# Patient Record
Sex: MALE | Race: OTHER | Employment: FULL TIME | URBAN - METROPOLITAN AREA
[De-identification: names, ages, dates, MRNs, and addresses within clinical notes are randomized per-mention and may not be internally consistent; named-entity substitution may affect disease eponyms.]

---

## 2018-01-29 ENCOUNTER — HOSPITAL ENCOUNTER (EMERGENCY)
Facility: HOSPITAL | Age: 23
Discharge: HOME | End: 2018-01-29
Attending: EMERGENCY MEDICINE
Payer: COMMERCIAL

## 2018-01-29 VITALS
WEIGHT: 185 LBS | RESPIRATION RATE: 16 BRPM | OXYGEN SATURATION: 97 % | DIASTOLIC BLOOD PRESSURE: 50 MMHG | SYSTOLIC BLOOD PRESSURE: 100 MMHG | TEMPERATURE: 99 F | BODY MASS INDEX: 31.58 KG/M2 | HEART RATE: 80 BPM | HEIGHT: 64 IN

## 2018-01-29 DIAGNOSIS — G40.909 SEIZURE DISORDER (HCC): Primary | ICD-10-CM

## 2018-01-29 LAB
ALBUMIN SERPL BCP-MCNC: 3.8 G/DL (ref 3.5–4.8)
ALP SERPL-CCNC: 82 U/L (ref 32–100)
ALT SERPL-CCNC: 72 U/L (ref 17–63)
ANION GAP SERPL CALC-SCNC: 14 MMOL/L (ref 0–18)
AST SERPL-CCNC: 40 U/L (ref 15–41)
BASOPHILS # BLD: 0 K/UL (ref 0–0.2)
BASOPHILS NFR BLD: 1 %
BILIRUB DIRECT SERPL-MCNC: 0.1 MG/DL (ref 0–0.2)
BILIRUB SERPL-MCNC: 0.4 MG/DL (ref 0.3–1.2)
BUN SERPL-MCNC: 15 MG/DL (ref 8–20)
BUN/CREAT SERPL: 20 (ref 10–20)
CALCIUM SERPL-MCNC: 9 MG/DL (ref 8.5–10.5)
CHLORIDE SERPL-SCNC: 104 MMOL/L (ref 95–110)
CO2 SERPL-SCNC: 22 MMOL/L (ref 22–32)
CREAT SERPL-MCNC: 0.75 MG/DL (ref 0.5–1.5)
EOSINOPHIL # BLD: 0.5 K/UL (ref 0–0.7)
EOSINOPHIL NFR BLD: 6 %
ERYTHROCYTE [DISTWIDTH] IN BLOOD BY AUTOMATED COUNT: 13.4 % (ref 11–15)
GLUCOSE SERPL-MCNC: 133 MG/DL (ref 70–99)
HCT VFR BLD AUTO: 47.9 % (ref 41–52)
HGB BLD-MCNC: 16.4 G/DL (ref 13.5–17.5)
LYMPHOCYTES # BLD: 1.5 K/UL (ref 1–4)
LYMPHOCYTES NFR BLD: 19 %
MCH RBC QN AUTO: 30.4 PG (ref 27–32)
MCHC RBC AUTO-ENTMCNC: 34.3 G/DL (ref 32–37)
MCV RBC AUTO: 88.8 FL (ref 80–100)
MONOCYTES # BLD: 0.6 K/UL (ref 0–1)
MONOCYTES NFR BLD: 8 %
NEUTROPHILS # BLD AUTO: 5.1 K/UL (ref 1.8–7.7)
NEUTROPHILS NFR BLD: 67 %
OSMOLALITY UR CALC.SUM OF ELEC: 293 MOSM/KG (ref 275–295)
PLATELET # BLD AUTO: 145 K/UL (ref 140–400)
PMV BLD AUTO: 10 FL (ref 7.4–10.3)
POTASSIUM SERPL-SCNC: 4.1 MMOL/L (ref 3.3–5.1)
PROT SERPL-MCNC: 7.1 G/DL (ref 5.9–8.4)
RBC # BLD AUTO: 5.39 M/UL (ref 4.5–5.9)
SODIUM SERPL-SCNC: 140 MMOL/L (ref 136–144)
WBC # BLD AUTO: 7.7 K/UL (ref 4–11)

## 2018-01-29 PROCEDURE — 80048 BASIC METABOLIC PNL TOTAL CA: CPT | Performed by: EMERGENCY MEDICINE

## 2018-01-29 PROCEDURE — 96361 HYDRATE IV INFUSION ADD-ON: CPT

## 2018-01-29 PROCEDURE — 80076 HEPATIC FUNCTION PANEL: CPT | Performed by: EMERGENCY MEDICINE

## 2018-01-29 PROCEDURE — 96375 TX/PRO/DX INJ NEW DRUG ADDON: CPT

## 2018-01-29 PROCEDURE — 96374 THER/PROPH/DIAG INJ IV PUSH: CPT

## 2018-01-29 PROCEDURE — 99284 EMERGENCY DEPT VISIT MOD MDM: CPT

## 2018-01-29 PROCEDURE — 85025 COMPLETE CBC W/AUTO DIFF WBC: CPT | Performed by: EMERGENCY MEDICINE

## 2018-01-29 RX ORDER — LEVETIRACETAM 500 MG/1
500 TABLET ORAL 2 TIMES DAILY
Qty: 60 TABLET | Refills: 0 | Status: SHIPPED | OUTPATIENT
Start: 2018-01-29 | End: 2018-06-18

## 2018-01-29 RX ORDER — LORAZEPAM 2 MG/ML
2 INJECTION INTRAMUSCULAR ONCE
Status: COMPLETED | OUTPATIENT
Start: 2018-01-29 | End: 2018-01-29

## 2018-01-29 RX ORDER — LORAZEPAM 2 MG/ML
INJECTION INTRAMUSCULAR
Status: COMPLETED
Start: 2018-01-29 | End: 2018-01-29

## 2018-01-29 NOTE — ED NOTES
All discharge instructions including discharge meds and follow up reviewed with patient and parents. Verbalized understanding. IV removed with catheter intact. Patient wheelchaired out of ED in no apparent distress.

## 2018-01-29 NOTE — ED INITIAL ASSESSMENT (HPI)
Patient presents via EMS. Per EMS, patient had a witnessed seizure at home lasting approximately 4 min.

## 2018-03-14 ENCOUNTER — HOSPITAL ENCOUNTER (EMERGENCY)
Facility: HOSPITAL | Age: 23
Discharge: HOME | End: 2018-03-14
Attending: EMERGENCY MEDICINE
Payer: COMMERCIAL

## 2018-03-14 VITALS
WEIGHT: 130 LBS | TEMPERATURE: 98 F | SYSTOLIC BLOOD PRESSURE: 117 MMHG | BODY MASS INDEX: 20.89 KG/M2 | RESPIRATION RATE: 14 BRPM | HEIGHT: 66 IN | OXYGEN SATURATION: 99 % | DIASTOLIC BLOOD PRESSURE: 61 MMHG | HEART RATE: 56 BPM

## 2018-03-14 DIAGNOSIS — G40.919 BREAKTHROUGH SEIZURE (HCC): Primary | ICD-10-CM

## 2018-03-14 LAB
ANION GAP SERPL CALC-SCNC: 10 MMOL/L (ref 0–18)
BASOPHILS # BLD: 0.1 K/UL (ref 0–0.2)
BASOPHILS NFR BLD: 1 %
BUN SERPL-MCNC: 13 MG/DL (ref 8–20)
BUN/CREAT SERPL: 12.9 (ref 10–20)
CALCIUM SERPL-MCNC: 9 MG/DL (ref 8.5–10.5)
CHLORIDE SERPL-SCNC: 106 MMOL/L (ref 95–110)
CO2 SERPL-SCNC: 22 MMOL/L (ref 22–32)
CREAT SERPL-MCNC: 1.01 MG/DL (ref 0.5–1.5)
EOSINOPHIL # BLD: 0.3 K/UL (ref 0–0.7)
EOSINOPHIL NFR BLD: 4 %
ERYTHROCYTE [DISTWIDTH] IN BLOOD BY AUTOMATED COUNT: 13.2 % (ref 11–15)
GLUCOSE SERPL-MCNC: 138 MG/DL (ref 70–99)
HCT VFR BLD AUTO: 49.5 % (ref 41–52)
HGB BLD-MCNC: 16.7 G/DL (ref 13.5–17.5)
LYMPHOCYTES # BLD: 1.6 K/UL (ref 1–4)
LYMPHOCYTES NFR BLD: 23 %
MCH RBC QN AUTO: 30.2 PG (ref 27–32)
MCHC RBC AUTO-ENTMCNC: 33.7 G/DL (ref 32–37)
MCV RBC AUTO: 89.6 FL (ref 80–100)
MONOCYTES # BLD: 0.4 K/UL (ref 0–1)
MONOCYTES NFR BLD: 7 %
NEUTROPHILS # BLD AUTO: 4.4 K/UL (ref 1.8–7.7)
NEUTROPHILS NFR BLD: 65 %
OSMOLALITY UR CALC.SUM OF ELEC: 288 MOSM/KG (ref 275–295)
PLATELET # BLD AUTO: 156 K/UL (ref 140–400)
PMV BLD AUTO: 10.4 FL (ref 7.4–10.3)
POTASSIUM SERPL-SCNC: 4.1 MMOL/L (ref 3.3–5.1)
RBC # BLD AUTO: 5.53 M/UL (ref 4.5–5.9)
SODIUM SERPL-SCNC: 138 MMOL/L (ref 136–144)
WBC # BLD AUTO: 6.7 K/UL (ref 4–11)

## 2018-03-14 PROCEDURE — 96375 TX/PRO/DX INJ NEW DRUG ADDON: CPT

## 2018-03-14 PROCEDURE — 96365 THER/PROPH/DIAG IV INF INIT: CPT

## 2018-03-14 PROCEDURE — 99285 EMERGENCY DEPT VISIT HI MDM: CPT

## 2018-03-14 PROCEDURE — 80048 BASIC METABOLIC PNL TOTAL CA: CPT | Performed by: EMERGENCY MEDICINE

## 2018-03-14 PROCEDURE — 85025 COMPLETE CBC W/AUTO DIFF WBC: CPT | Performed by: EMERGENCY MEDICINE

## 2018-03-14 RX ORDER — LEVETIRACETAM 500 MG/1
500 TABLET ORAL 2 TIMES DAILY
Qty: 60 TABLET | Refills: 0 | Status: SHIPPED | OUTPATIENT
Start: 2018-03-14 | End: 2018-04-13

## 2018-03-14 RX ORDER — LORAZEPAM 2 MG/ML
INJECTION INTRAMUSCULAR
Status: DISCONTINUED
Start: 2018-03-14 | End: 2018-03-14

## 2018-03-14 RX ORDER — LORAZEPAM 2 MG/ML
1 INJECTION INTRAMUSCULAR ONCE
Status: COMPLETED | OUTPATIENT
Start: 2018-03-14 | End: 2018-03-14

## 2018-03-14 NOTE — ED NOTES
Pt states was given a prescription 2 weeks ago for seizure and he ran out. Admits to smoking marijuana.  Did bite right side of tongue

## 2018-03-14 NOTE — ED INITIAL ASSESSMENT (HPI)
Via medics, witnessed seizure at work, approximately 10 min per staff at work    Hx of seizures, last seizure 2 weeks ago.  Not on any anti seizure meds because he ran out

## 2018-03-14 NOTE — ED PROVIDER NOTES
Patient Seen in: [facility] AND [facility] Emergency Department    History   Patient presents with:  Seizure Disorder (neurologic)    Stated Complaint: seizure    HPI    The patient is a 27-year-old male with a long history of seizures who presents  after having (Oral)   Resp 14   Ht 167.6 cm (5' 6\")   Wt 59 kg   SpO2 99%   BMI 20.98 kg/m²         Physical Exam   Constitutional: He is oriented to person, place, and time. He appears well-developed and well-nourished. HENT:   Head: Normocephalic and atraumatic. DIFFERENTIAL[[id number]]          Abnormal            Final result                 Please view results for these tests on the individual orders.        ED Course as of Mar 14 1252  ------------------------------------------------------------       [facility]     Pa

## 2018-03-16 ENCOUNTER — TELEPHONE (OUTPATIENT)
Dept: FAMILY MEDICINE CLINIC | Facility: CLINIC | Age: 23
End: 2018-03-16

## 2018-03-16 NOTE — TELEPHONE ENCOUNTER
Patient needs note to return to work with no restrictions on Monday 03/19/2018. Patient suffered a Seizure 03/14/2018. Needs note ASAP. Unable to come in for appointment due to has [address].      Please Advise

## 2018-06-09 ENCOUNTER — HOSPITAL (OUTPATIENT)
Dept: OTHER | Age: 23
End: 2018-06-09
Attending: EMERGENCY MEDICINE

## 2018-06-09 LAB
ANALYZER ANC (IANC): ABNORMAL
ANION GAP SERPL CALC-SCNC: 24 MMOL/L (ref 10–20)
BASOPHILS # BLD: 0 THOUSAND/MCL (ref 0–0.3)
BASOPHILS NFR BLD: 0 %
BUN SERPL-MCNC: 11 MG/DL (ref 6–20)
BUN/CREAT SERPL: 11 (ref 7–25)
CALCIUM SERPL-MCNC: 9.5 MG/DL (ref 8.4–10.2)
CHLORIDE: 104 MMOL/L (ref 98–107)
CO2 SERPL-SCNC: 14 MMOL/L (ref 21–32)
CREAT SERPL-MCNC: 0.98 MG/DL (ref 0.67–1.17)
DIFFERENTIAL METHOD BLD: ABNORMAL
EOSINOPHIL # BLD: 0.1 THOUSAND/MCL (ref 0.1–0.5)
EOSINOPHIL NFR BLD: 0 %
ERYTHROCYTE [DISTWIDTH] IN BLOOD: 13.2 % (ref 11–15)
GLUCOSE SERPL-MCNC: 142 MG/DL (ref 65–99)
HEMATOCRIT: 53.8 % (ref 39–51)
HGB BLD-MCNC: 18.2 GM/DL (ref 13–17)
LYMPHOCYTES # BLD: 3.3 THOUSAND/MCL (ref 1–4.8)
LYMPHOCYTES NFR BLD: 21 %
MCH RBC QN AUTO: 31.1 PG (ref 26–34)
MCHC RBC AUTO-ENTMCNC: 33.8 GM/DL (ref 32–36.5)
MCV RBC AUTO: 92 FL (ref 78–100)
MONOCYTES # BLD: 0.9 THOUSAND/MCL (ref 0.3–0.9)
MONOCYTES NFR BLD: 6 %
NEUTROPHILS # BLD: 11.4 THOUSAND/MCL (ref 1.8–7.7)
NEUTROPHILS NFR BLD: 73 %
NEUTS SEG NFR BLD: ABNORMAL %
NRBC (NRBCRE): ABNORMAL
PHENYTOIN SERPL-MCNC: <0.5 MCG/ML (ref 10–20)
PLATELET # BLD: 235 THOUSAND/MCL (ref 140–450)
POTASSIUM SERPL-SCNC: 4.2 MMOL/L (ref 3.4–5.1)
RBC # BLD: 5.85 MILLION/MCL (ref 4.5–5.9)
SODIUM SERPL-SCNC: 138 MMOL/L (ref 135–145)
VALPROATE SERPL-MCNC: <3 MCG/ML (ref 50–125)
WBC # BLD: 15.7 THOUSAND/MCL (ref 4.2–11)

## 2018-06-18 ENCOUNTER — OFFICE VISIT (OUTPATIENT)
Dept: FAMILY MEDICINE CLINIC | Facility: CLINIC | Age: 23
End: 2018-06-18

## 2018-06-18 VITALS
SYSTOLIC BLOOD PRESSURE: 145 MMHG | DIASTOLIC BLOOD PRESSURE: 80 MMHG | BODY MASS INDEX: 32.1 KG/M2 | WEIGHT: 188 LBS | HEART RATE: 49 BPM | HEIGHT: 64 IN

## 2018-06-18 DIAGNOSIS — G40.909 SEIZURE DISORDER (HCC): Primary | ICD-10-CM

## 2018-06-18 PROCEDURE — 99214 OFFICE O/P EST MOD 30 MIN: CPT | Performed by: NURSE PRACTITIONER

## 2018-06-18 RX ORDER — LEVETIRACETAM 500 MG/1
500 TABLET ORAL 2 TIMES DAILY
Qty: 60 TABLET | Refills: 3 | Status: SHIPPED | OUTPATIENT
Start: 2018-06-18 | End: 2018-10-26

## 2018-06-18 NOTE — PROGRESS NOTES
HPI  Pt here for note to return to work . Has been off of work for 2 weeks due to seizure. 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 ADmits that he has been out of his meds for >3 weeks; doesn't like taking medications. Has had seizures since childhood. Concern    Caffeine Concern Yes    Comment: (Soda, Energy Drinks) 8 oz daily    Exercise No     Social History Narrative   None on file         Current Outpatient Prescriptions:  levETIRAcetam 500 MG Oral Tab Take 1 tablet (500 mg total) by mouth 2 (two) t

## 2018-06-18 NOTE — PATIENT INSTRUCTIONS
Epilepsy: How Seizures Affect the Body  The brain is the “control center” of your body. It manages everything from movement and balance to emotions and memory.  When a seizure happens, some or all brain functions are temporarily affected.        Normal EE · Complex partial seizures. These may also start with an aura. You may become motionless and have a vacant stare.  Or you may perform “automatisms.” These are repeated movements, such as smacking lips or gesturing. A complex seizure means there is a change Your healthcare provider will work with you to create the best medicine plan for you:  · Type of medicine. There are many types of AEDs. The first type you try will likely help you.  If not, your healthcare provider may suggest another type, or a combinatio Epilepsy medicines often have effects that are not intended (side effects). Most of these effects go away after a few weeks.  The most common side effects of epilepsy medicines include:  · Dizziness  · Trouble concentrating  · Tiredness  · Stomach upset  · People with epilepsy can lead healthy, productive lives. Life with epilepsy can be challenging, but there are things you can do to make it easier. For example, you can pay attention to your emotions.  If you feel down, upset, or scared, talk to your healthc Epilepsy affects those around you, too. Talk with your loved ones and learn their concerns. For instance, your children may be afraid for your safety. Reassure them that you can live a long, healthy life with epilepsy.  Your partner may wonder if a normal s Seizures typically last less than 3 minutes, but it will feel like it is longer. People recover safely from most seizures. During a tonic-clonic seizure, the person may appear to stop breathing or turn slightly blue.  This may be scary for you, but try to s · A head injury immediately after or within a few days after the injury happened  · Multiple seizures happen in a short period of time  · The person stops breathing  · A seizure that happens in water  · The person hit his or her head during a seizure and b

## 2018-09-24 ENCOUNTER — HOSPITAL ENCOUNTER (EMERGENCY)
Facility: HOSPITAL | Age: 23
Discharge: STILL ADMITTED OR ADMINISTRATIVE | End: 2018-09-24
Payer: MEDICAID

## 2018-09-24 ENCOUNTER — HOSPITAL ENCOUNTER (EMERGENCY)
Facility: HOSPITAL | Age: 23
Discharge: HOME | End: 2018-09-24
Attending: EMERGENCY MEDICINE
Payer: MEDICAID

## 2018-09-24 VITALS
WEIGHT: 195 LBS | SYSTOLIC BLOOD PRESSURE: 123 MMHG | HEART RATE: 88 BPM | DIASTOLIC BLOOD PRESSURE: 83 MMHG | TEMPERATURE: 98 F | RESPIRATION RATE: 21 BRPM | BODY MASS INDEX: 33.29 KG/M2 | HEIGHT: 64 IN | OXYGEN SATURATION: 99 %

## 2018-09-24 DIAGNOSIS — Z91.14 NONCOMPLIANCE WITH MEDICATIONS: ICD-10-CM

## 2018-09-24 DIAGNOSIS — G40.909 SEIZURE DISORDER (HCC): Primary | ICD-10-CM

## 2018-09-24 LAB
ANION GAP SERPL CALC-SCNC: 16 MMOL/L (ref 0–18)
BASOPHILS # BLD: 0.1 K/UL (ref 0–0.2)
BASOPHILS NFR BLD: 1 %
BUN SERPL-MCNC: 10 MG/DL (ref 8–20)
BUN/CREAT SERPL: 8.2 (ref 10–20)
CALCIUM SERPL-MCNC: 9.7 MG/DL (ref 8.5–10.5)
CHLORIDE SERPL-SCNC: 103 MMOL/L (ref 95–110)
CO2 SERPL-SCNC: 16 MMOL/L (ref 22–32)
CREAT SERPL-MCNC: 1.22 MG/DL (ref 0.5–1.5)
EOSINOPHIL # BLD: 0.3 K/UL (ref 0–0.7)
EOSINOPHIL NFR BLD: 3 %
ERYTHROCYTE [DISTWIDTH] IN BLOOD BY AUTOMATED COUNT: 13.4 % (ref 11–15)
GLUCOSE SERPL-MCNC: 119 MG/DL (ref 70–99)
HCT VFR BLD AUTO: 52.5 % (ref 41–52)
HGB BLD-MCNC: 17.7 G/DL (ref 13.5–17.5)
LYMPHOCYTES # BLD: 2.6 K/UL (ref 1–4)
LYMPHOCYTES NFR BLD: 23 %
MAGNESIUM SERPL-MCNC: 2.7 MG/DL (ref 1.8–2.5)
MCH RBC QN AUTO: 30.7 PG (ref 27–32)
MCHC RBC AUTO-ENTMCNC: 33.7 G/DL (ref 32–37)
MCV RBC AUTO: 91.1 FL (ref 80–100)
MONOCYTES # BLD: 1 K/UL (ref 0–1)
MONOCYTES NFR BLD: 9 %
NEUTROPHILS # BLD AUTO: 7.3 K/UL (ref 1.8–7.7)
NEUTROPHILS NFR BLD: 65 %
OSMOLALITY UR CALC.SUM OF ELEC: 280 MOSM/KG (ref 275–295)
PLATELET # BLD AUTO: 184 K/UL (ref 140–400)
PMV BLD AUTO: 10.1 FL (ref 7.4–10.3)
POTASSIUM SERPL-SCNC: 4 MMOL/L (ref 3.3–5.1)
RBC # BLD AUTO: 5.77 M/UL (ref 4.5–5.9)
SODIUM SERPL-SCNC: 135 MMOL/L (ref 136–144)
WBC # BLD AUTO: 11.1 K/UL (ref 4–11)

## 2018-09-24 PROCEDURE — 96374 THER/PROPH/DIAG INJ IV PUSH: CPT

## 2018-09-24 PROCEDURE — 83735 ASSAY OF MAGNESIUM: CPT | Performed by: EMERGENCY MEDICINE

## 2018-09-24 PROCEDURE — 85025 COMPLETE CBC W/AUTO DIFF WBC: CPT | Performed by: EMERGENCY MEDICINE

## 2018-09-24 PROCEDURE — 96361 HYDRATE IV INFUSION ADD-ON: CPT

## 2018-09-24 PROCEDURE — 99284 EMERGENCY DEPT VISIT MOD MDM: CPT

## 2018-09-24 PROCEDURE — 80048 BASIC METABOLIC PNL TOTAL CA: CPT | Performed by: EMERGENCY MEDICINE

## 2018-09-24 RX ORDER — LORAZEPAM 2 MG/ML
0.5 INJECTION INTRAMUSCULAR ONCE
Status: COMPLETED | OUTPATIENT
Start: 2018-09-24 | End: 2018-09-24

## 2018-09-24 RX ORDER — LEVETIRACETAM 500 MG/1
500 TABLET ORAL 2 TIMES DAILY
Qty: 600 TABLET | Refills: 0 | Status: SHIPPED | OUTPATIENT
Start: 2018-09-24 | End: 2018-10-24

## 2018-09-24 NOTE — ED PROVIDER NOTES
Patient Seen in: [facility] Emergency Department    History   Patient presents with:  Seizure Disorder (neurologic)    Stated Complaint: SEIZURE    HPI    21-year-old male with a history of seizure disorder on unknown dose of Keppra presents now w Conjunctivae are normal. Pupils are equal, round, and reactive to light. Neck: Normal range of motion. Neck supple. Cardiovascular: Tachycardic around 125, regular rhythm and intact and equal distal pulses.     Pulmonary/Chest: Effort normal. No respira GOLD   RAINBOW DRAW LAVENDER TALL (BNP)                MDM   No trauma and patient was without symptoms or focal neuro deficits so no indication for neuroimaging at this time. Patient encouraged to take the medication as prescribed.   He needs to follow-up

## 2018-10-26 ENCOUNTER — OFFICE VISIT (OUTPATIENT)
Dept: FAMILY MEDICINE CLINIC | Facility: CLINIC | Age: 23
End: 2018-10-26
Payer: MEDICAID

## 2018-10-26 VITALS
BODY MASS INDEX: 33.29 KG/M2 | SYSTOLIC BLOOD PRESSURE: 119 MMHG | WEIGHT: 195 LBS | DIASTOLIC BLOOD PRESSURE: 66 MMHG | HEART RATE: 68 BPM | HEIGHT: 64 IN

## 2018-10-26 DIAGNOSIS — G40.909 SEIZURE DISORDER (HCC): Primary | ICD-10-CM

## 2018-10-26 PROCEDURE — 90471 IMMUNIZATION ADMIN: CPT | Performed by: FAMILY MEDICINE

## 2018-10-26 PROCEDURE — 99214 OFFICE O/P EST MOD 30 MIN: CPT | Performed by: FAMILY MEDICINE

## 2018-10-26 PROCEDURE — 99212 OFFICE O/P EST SF 10 MIN: CPT | Performed by: FAMILY MEDICINE

## 2018-10-26 PROCEDURE — 90686 IIV4 VACC NO PRSV 0.5 ML IM: CPT | Performed by: FAMILY MEDICINE

## 2018-10-26 RX ORDER — LEVETIRACETAM 750 MG/1
750 TABLET ORAL 2 TIMES DAILY
Qty: 180 TABLET | Refills: 3 | Status: SHIPPED | OUTPATIENT
Start: 2018-10-26 | End: 2018-12-05

## 2018-10-26 NOTE — PROGRESS NOTES
Went to er 2 mo ago for sz  Then had one Wednesday on way to work. (not driving I can't drive.)  Has appt dec 5th   Kandy Gilford on Dec 5th  I wan't taking constantly when I went to er.   This am I woke up and I didn't feel well , headache so I wonder

## 2018-11-16 ENCOUNTER — HOSPITAL ENCOUNTER (OUTPATIENT)
Dept: ELECTROPHYSIOLOGY | Facility: HOSPITAL | Age: 23
Discharge: HOME | End: 2018-11-16
Attending: FAMILY MEDICINE
Payer: MEDICAID

## 2018-11-16 PROCEDURE — 95816 EEG AWAKE AND DROWSY: CPT | Performed by: OTHER

## 2018-11-17 NOTE — ADDENDUM NOTE
Encounter addended by: Marlyn Bryan MD on: 11/17/2018 12:44 PM   Actions taken: Charge Capture section accepted

## 2018-12-05 ENCOUNTER — OFFICE VISIT (OUTPATIENT)
Dept: NEUROLOGY | Facility: CLINIC | Age: 23
End: 2018-12-05
Payer: MEDICAID

## 2018-12-05 VITALS
DIASTOLIC BLOOD PRESSURE: 62 MMHG | SYSTOLIC BLOOD PRESSURE: 120 MMHG | WEIGHT: 185 LBS | BODY MASS INDEX: 31.58 KG/M2 | HEIGHT: 64 IN | HEART RATE: 56 BPM

## 2018-12-05 DIAGNOSIS — R56.9 SEIZURE (HCC): Primary | ICD-10-CM

## 2018-12-05 PROCEDURE — 99214 OFFICE O/P EST MOD 30 MIN: CPT | Performed by: OTHER

## 2018-12-05 RX ORDER — DIVALPROEX SODIUM 500 MG/1
1000 TABLET, EXTENDED RELEASE ORAL DAILY
Qty: 60 TABLET | Refills: 2 | Status: SHIPPED | OUTPATIENT
Start: 2018-12-05 | End: 2019-03-09

## 2018-12-05 NOTE — PROGRESS NOTES
Neurology Follow up Visit     Referred By: Dr. Ho ref. provider found    Chief Complaint: Patient presents with:  Seizures: LOV w/Dr Zuleyka Gross 1-14-16 pt is here to f/u on seizures. Pt had one seizure on 11-30-18, it last about 3 to 5 minutes.  currently ta Never Used       Alcohol use Yes 0.0 oz/week   Comment: socially        Drug use:  7 times per week   Types: Cannabis       Family History   Problem Relation Age of Onset   • Hypertension Maternal Grandmother    • Diabetes Paternal Grandmother          Cur symmetric  Brachioradialis 2 + bilateral symmetric  Patellar 2+ bilateral symmetric  Ankle jerk 2+ bilateral symmetric    No clonus  No Babinski sign    Coordination:  Finger to nose intact  Rapid alternating movements intact    Gait:  Normal posture  Norm

## 2019-03-09 ENCOUNTER — HOSPITAL ENCOUNTER (EMERGENCY)
Facility: HOSPITAL | Age: 24
Discharge: HOME | End: 2019-03-09
Attending: EMERGENCY MEDICINE

## 2019-03-09 VITALS
HEIGHT: 65 IN | TEMPERATURE: 100 F | SYSTOLIC BLOOD PRESSURE: 121 MMHG | WEIGHT: 210 LBS | OXYGEN SATURATION: 98 % | RESPIRATION RATE: 19 BRPM | BODY MASS INDEX: 34.99 KG/M2 | DIASTOLIC BLOOD PRESSURE: 76 MMHG | HEART RATE: 77 BPM

## 2019-03-09 DIAGNOSIS — G40.919 BREAKTHROUGH SEIZURE (HCC): Primary | ICD-10-CM

## 2019-03-09 LAB — VALPROATE SERPL-MCNC: 6.5 UG/ML (ref 50–100)

## 2019-03-09 PROCEDURE — 36415 COLL VENOUS BLD VENIPUNCTURE: CPT

## 2019-03-09 PROCEDURE — 80164 ASSAY DIPROPYLACETIC ACD TOT: CPT | Performed by: EMERGENCY MEDICINE

## 2019-03-09 PROCEDURE — 99283 EMERGENCY DEPT VISIT LOW MDM: CPT

## 2019-03-09 RX ORDER — VALPROIC ACID 250 MG/1
500 CAPSULE, LIQUID FILLED ORAL ONCE
Status: COMPLETED | OUTPATIENT
Start: 2019-03-09 | End: 2019-03-09

## 2019-03-09 RX ORDER — DIVALPROEX SODIUM 500 MG/1
1000 TABLET, EXTENDED RELEASE ORAL DAILY
Qty: 60 TABLET | Refills: 2 | Status: SHIPPED | OUTPATIENT
Start: 2019-03-09 | End: 2019-03-12

## 2019-03-09 NOTE — ED NOTES
Pt here for witnessed seizure at home. Family states Shayna Lew had a 2 minute long grand mal type seizure. No fall, no injury. Pt confirms he takes Depakote at home. Pt unsure when last seizure was, but thinks it was a year ago.  Pt denies any drinking, no ch

## 2019-03-09 NOTE — ED INITIAL ASSESSMENT (HPI)
Pt had approximately 2 minute long witnessed seizure. No fall, no injury. Pt has hx of seizures, pt unsure what medications he takes.

## 2019-03-10 NOTE — ED PROVIDER NOTES
Patient Seen in: [facility] Emergency Department    History   Patient presents with:  Seizure Disorder (neurologic)      HPI    Presents to the ED after a 2-minute long witnessed seizure by family.   No injury, lying down in the seizure and now pre the presenting problem noted.     Physical Exam     ED Triage Vitals   BP 03/09/19 1721 127/55   Pulse 03/09/19 1720 115   Resp 03/09/19 1720 17   Temp 03/09/19 1720 100 °F (37.8 °C)   Temp src 03/09/19 1720 Temporal   SpO2 03/09/19 1720 94 %   O2 Device 03 5\")       *I personally reviewed and interpreted all ED vitals.     Pulse Ox: 98%, Room air, Normal      Differential Diagnosis/ Diagnostic Considerations: seizure    Medical Record Review: I personally reviewed available prior medical records for any rece

## 2019-03-12 ENCOUNTER — OFFICE VISIT (OUTPATIENT)
Dept: NEUROLOGY | Facility: CLINIC | Age: 24
End: 2019-03-12

## 2019-03-12 VITALS — DIASTOLIC BLOOD PRESSURE: 77 MMHG | HEART RATE: 55 BPM | RESPIRATION RATE: 17 BRPM | SYSTOLIC BLOOD PRESSURE: 130 MMHG

## 2019-03-12 DIAGNOSIS — R56.9 SEIZURE (HCC): Primary | ICD-10-CM

## 2019-03-12 PROCEDURE — 99213 OFFICE O/P EST LOW 20 MIN: CPT | Performed by: OTHER

## 2019-03-12 RX ORDER — DIVALPROEX SODIUM 500 MG/1
1000 TABLET, EXTENDED RELEASE ORAL DAILY
Qty: 180 TABLET | Refills: 3 | Status: SHIPPED | OUTPATIENT
Start: 2019-03-12

## 2019-03-12 NOTE — PROGRESS NOTES
Neurology Follow up Visit     Referred By: Dr. Ho ref. provider found    Chief Complaint: Patient presents with:  Seizure Disorder (neurologic): Pt went to the ER on Saturday he had a seizure and was given medication.  Pt has had one since Saturday Lipid screening 09-    per [employer]   • Seizure [facility])    • Seizure disorder [facility])        Past Surgical History:   Procedure Laterality Date   • ELECTROCARDIOGRAM, COMPLETE  09-    Scanned to Media Tab - Date of Service 09-       Social intact  XII.  Tongue is midline    Motor Exam:  Muscle tone normal  No atrophy or fasciculations  Strength- upper extremities 5/5 proximally and distally                  - lower  extremities 5/5 proximally and distally    Sensory Exam:  Light touch sensati effects of drugs were discussed. Return to clinic in: Return in about 1 year (around 3/12/2020).     Jovanna Guevara MD

## 2019-09-20 ENCOUNTER — OFFICE VISIT (OUTPATIENT)
Dept: FAMILY MEDICINE CLINIC | Facility: CLINIC | Age: 24
End: 2019-09-20
Payer: MEDICAID

## 2019-09-20 VITALS
BODY MASS INDEX: 34.83 KG/M2 | SYSTOLIC BLOOD PRESSURE: 115 MMHG | DIASTOLIC BLOOD PRESSURE: 67 MMHG | HEART RATE: 46 BPM | OXYGEN SATURATION: 97 % | HEIGHT: 64 IN | WEIGHT: 204 LBS

## 2019-09-20 DIAGNOSIS — R10.84 GENERALIZED ABDOMINAL PAIN: ICD-10-CM

## 2019-09-20 DIAGNOSIS — M54.50 ACUTE BILATERAL LOW BACK PAIN WITHOUT SCIATICA: Primary | ICD-10-CM

## 2019-09-20 PROCEDURE — 99213 OFFICE O/P EST LOW 20 MIN: CPT | Performed by: NURSE PRACTITIONER

## 2019-09-20 NOTE — PROGRESS NOTES
HPI    Patient presents for abdominal and back pain. Both have completely resolved. Missed three days of work and needs a letter to return. Works in restoration.        Review of Systems   Gastrointestinal: Negative for nausea, vomiting, abdominal pain a use: Yes        Alcohol/week: 0.0 standard drinks        Comment: socially       Drug use: Yes        Frequency: 7.0 times per week        Types: Cannabis      Sexual activity: Not on file    Lifestyle      Physical activity:        Days per week: Not on f Cardiovascular: Normal rate, regular rhythm and normal heart sounds. No murmur heard. Pulmonary/Chest: Effort normal and breath sounds normal. No respiratory distress. He has no wheezes. He has no rales. Abdominal: Soft.  Bowel sounds are normal. He

## 2019-09-20 NOTE — PATIENT INSTRUCTIONS
Back Care Tips    Caring for your back  These are things you can do to prevent a recurrence of acute back pain and to reduce symptoms from chronic back pain:  · Stay at a healthy weight.  If you are overweight, losing weight will help most types of back p · Be careful if you are given prescription pain medicines, opioids, or medicine for muscle spasm. They can cause drowsiness, and affect your coordination, reflexes, and judgment. Don't drive or operate heavy machinery while taking these types of medicines. The best way to sleep is on your side with your knees bent. Put a low pillow under your head to support your neck in a neutral spine position. Don't use thick pillows that bend your neck to one side.  Put a pillow between your legs to further relax your low

## 2019-10-05 ENCOUNTER — HOSPITAL ENCOUNTER (EMERGENCY)
Facility: HOSPITAL | Age: 24
Discharge: HOME | End: 2019-10-05
Attending: EMERGENCY MEDICINE
Payer: MEDICAID

## 2019-10-05 VITALS
SYSTOLIC BLOOD PRESSURE: 126 MMHG | BODY MASS INDEX: 34.55 KG/M2 | OXYGEN SATURATION: 97 % | WEIGHT: 215 LBS | TEMPERATURE: 98 F | RESPIRATION RATE: 22 BRPM | DIASTOLIC BLOOD PRESSURE: 71 MMHG | HEIGHT: 66 IN | HEART RATE: 61 BPM

## 2019-10-05 DIAGNOSIS — Z91.14 NONCOMPLIANCE WITH MEDICATION REGIMEN: ICD-10-CM

## 2019-10-05 DIAGNOSIS — G40.909 SEIZURE DISORDER (HCC): Primary | ICD-10-CM

## 2019-10-05 PROCEDURE — 96360 HYDRATION IV INFUSION INIT: CPT

## 2019-10-05 PROCEDURE — 82962 GLUCOSE BLOOD TEST: CPT

## 2019-10-05 PROCEDURE — 80048 BASIC METABOLIC PNL TOTAL CA: CPT | Performed by: EMERGENCY MEDICINE

## 2019-10-05 PROCEDURE — 85025 COMPLETE CBC W/AUTO DIFF WBC: CPT | Performed by: EMERGENCY MEDICINE

## 2019-10-05 PROCEDURE — 93010 ELECTROCARDIOGRAM REPORT: CPT | Performed by: EMERGENCY MEDICINE

## 2019-10-05 PROCEDURE — 93005 ELECTROCARDIOGRAM TRACING: CPT

## 2019-10-05 PROCEDURE — 96361 HYDRATE IV INFUSION ADD-ON: CPT

## 2019-10-05 PROCEDURE — 80164 ASSAY DIPROPYLACETIC ACD TOT: CPT | Performed by: EMERGENCY MEDICINE

## 2019-10-05 PROCEDURE — 99284 EMERGENCY DEPT VISIT MOD MDM: CPT

## 2019-10-05 NOTE — ED PROVIDER NOTES
Patient Seen in: [facility] AND [facility] Emergency Department      History   Patient presents with:  Seizure Disorder (neurologic)    Stated Complaint: seizure    HPI    50-year-old male with known seizure disorder who last saw his neurologist in March of this Physical Exam    Constitutional: Oriented to person, place, and time. Appears well-developed. No distress. Head: Normocephalic and atraumatic. Eyes: Conjunctivae are normal. Pupils are equal, round, and reactive to light.    Neck: Normal range Report. Rate: 98  Rhythm: Sinus Rhythm  Reading: No acute ischemic changes                      MDM     Patient never had symptoms here. He was with several family members to be staying with tonight. The patient does not drive.   The machine to test the

## 2019-10-05 NOTE — ED INITIAL ASSESSMENT (HPI)
Patient brought in by EMS after seizure that last 3-4 minutes. Patient has history of seizures and takes depakote.

## 2019-10-05 NOTE — ED NOTES
Patient states unknown last seizure, but it has been several years. Patient was in bed when he had the seizure, denies injury.

## 2019-10-08 ENCOUNTER — TELEPHONE (OUTPATIENT)
Dept: FAMILY MEDICINE CLINIC | Facility: CLINIC | Age: 24
End: 2019-10-08

## 2019-10-10 NOTE — TELEPHONE ENCOUNTER
Tried to contact patient several times but no luck Left message to sone of patient voice mail. See message below.

## 2021-04-09 DIAGNOSIS — Z23 NEED FOR VACCINATION: ICD-10-CM

## 2022-06-07 ENCOUNTER — OFFICE VISIT (OUTPATIENT)
Dept: FAMILY MEDICINE CLINIC | Facility: CLINIC | Age: 27
End: 2022-06-07
Payer: COMMERCIAL

## 2022-06-07 VITALS
HEIGHT: 66 IN | SYSTOLIC BLOOD PRESSURE: 130 MMHG | HEART RATE: 51 BPM | BODY MASS INDEX: 30.41 KG/M2 | DIASTOLIC BLOOD PRESSURE: 68 MMHG | WEIGHT: 189.19 LBS

## 2022-06-07 DIAGNOSIS — G93.3 POST VIRAL SYNDROME: Primary | ICD-10-CM

## 2022-06-07 PROCEDURE — 3008F BODY MASS INDEX DOCD: CPT | Performed by: FAMILY MEDICINE

## 2022-06-07 PROCEDURE — 3078F DIAST BP <80 MM HG: CPT | Performed by: FAMILY MEDICINE

## 2022-06-07 PROCEDURE — 3075F SYST BP GE 130 - 139MM HG: CPT | Performed by: FAMILY MEDICINE

## 2022-06-07 PROCEDURE — 99203 OFFICE O/P NEW LOW 30 MIN: CPT | Performed by: FAMILY MEDICINE

## 2023-07-14 NOTE — ED INITIAL ASSESSMENT (HPI)
According to pt he was sleeping on the couch then woke up on the floor with his father turning pt on his side so he wouldn't aspirate.  Pt states his last seizure was last week but he just doesn't say anything because he doesn't want to come to the hospital
elevated BP

## (undated) NOTE — LETTER
18          Carolina De León  :  1995      To Whom It May Concern: This patient was seen in our office on 18 for evaluation. If this office may be of further assistance, please do not hesitate to contact us.       Sincerely,

## (undated) NOTE — LETTER
March 14, 2018    Patient: Daniel Cleaning   Date of Visit: 3/14/2018       To Whom It May Concern:    Daniel Cleaning was seen and treated in our emergency department on 3/14/2018.  He can return to work with these limitations: May not drive or operate

## (undated) NOTE — LETTER
19          Michelle Sales  :  1995      To Whom It May Concern: This patient was seen in our office on 19 . Work status:   May return to work 19         If this office may be of further assistance, please do not hesitate to co

## (undated) NOTE — ED AVS SNAPSHOT
Alejandra Carrillo   MRN: [id number]    Department:  [facility] Emergency Department   Date of Visit:  3/14/2018           Disclosure     Insurance plans vary and the physician(s) referred by the ER may not be covered by your plan.  Please contact CARE PHYSICIAN AT ONCE OR RETURN IMMEDIATELY TO THE EMERGENCY DEPARTMENT. If you have been prescribed any medication(s), please fill your prescription right away and begin taking the medication(s) as directed.   If you believe that any of the medications

## (undated) NOTE — LETTER
6/18/2018          To Whom It May Concern:    Anan Chisholm is currently under my medical care and may not return to work at this time. Please excuse Yovany Brooks for 2 weeks. He may return to work on June 19, 2018.   Activity is restricted as follows: non

## (undated) NOTE — LETTER
9/16/2019          To Whom It May Concern:    Lorne Hidalgo is currently under my medical care and may not return to work at this time. Please excuse Carley Huerta for 4 days. He may return to work on 9/20/19. Activity is restricted as follows: none.     I

## (undated) NOTE — LETTER
10/26/2018          To Whom It May Concern:    Ayo Coker is currently under my medical care  at this time. Please excuse Anny Simms for 10/26/18. He may return to work without restrictions .     If you require additional information please contact ou

## (undated) NOTE — ED AVS SNAPSHOT
Michelle Sales   MRN: [id number]    Department:  [facility] Emergency Department   Date of Visit:  3/9/2019           Disclosure     Insurance plans vary and the physician(s) referred by the ER may not be covered by your plan.  Please contact y CARE PHYSICIAN AT ONCE OR RETURN IMMEDIATELY TO THE EMERGENCY DEPARTMENT. If you have been prescribed any medication(s), please fill your prescription right away and begin taking the medication(s) as directed.   If you believe that any of the medications

## (undated) NOTE — LETTER
6/18/2018              Elieser Simpson        [address] [place] [address]         Dear Shayna Lew,      Sincerely,    ARIADNA Mohan, FNP-C  [facility], [address], [address], [address]  [place] [address]

## (undated) NOTE — LETTER
Date & Time: 10/5/2019, 7:49 PM  Patient: Elba Malika  Encounter Provider(s):    Lesvia Edwards MD       To Whom It May Concern:    Shamar Malika was seen and treated in our department on 10/5/2019. He should not return to work until 10/07/19.

## (undated) NOTE — LETTER
6/7/2022          To Whom It May Concern:    Baldemar Phelps is currently under my medical care and may return to work at this time. He may return to work on 06/08/2022. Activity is restricted as follows: none. If you require additional information please contact our office.         Sincerely,      Michael Sneed MD          Document generated by:  Michael Sneed MD

## (undated) NOTE — ED AVS SNAPSHOT
Quinn Hill   MRN: [id number]    Department:  [facility] Emergency Department   Date of Visit:  10/5/2019           Disclosure     Insurance plans vary and the physician(s) referred by the ER may not be covered by your plan.  Please contact CARE PHYSICIAN AT ONCE OR RETURN IMMEDIATELY TO THE EMERGENCY DEPARTMENT. If you have been prescribed any medication(s), please fill your prescription right away and begin taking the medication(s) as directed.   If you believe that any of the medications

## (undated) NOTE — ED AVS SNAPSHOT
Ayo Solorzanogustabo   MRN: [id number]    Department:  [facility] Emergency Department   Date of Visit:  1/29/2018           Disclosure     Insurance plans vary and the physician(s) referred by the ER may not be covered by your plan.  Please contact CARE PHYSICIAN AT ONCE OR RETURN IMMEDIATELY TO THE EMERGENCY DEPARTMENT. If you have been prescribed any medication(s), please fill your prescription right away and begin taking the medication(s) as directed.   If you believe that any of the medications